# Patient Record
Sex: FEMALE
[De-identification: names, ages, dates, MRNs, and addresses within clinical notes are randomized per-mention and may not be internally consistent; named-entity substitution may affect disease eponyms.]

---

## 2022-01-10 ENCOUNTER — HOSPITAL ENCOUNTER (OUTPATIENT)
Dept: HOSPITAL 5 - TRG | Age: 32
Discharge: HOME | End: 2022-01-10
Attending: OBSTETRICS & GYNECOLOGY
Payer: COMMERCIAL

## 2022-01-10 VITALS — SYSTOLIC BLOOD PRESSURE: 126 MMHG | DIASTOLIC BLOOD PRESSURE: 69 MMHG

## 2022-01-10 DIAGNOSIS — Z3A.31: ICD-10-CM

## 2022-01-10 DIAGNOSIS — O36.8130: Primary | ICD-10-CM

## 2022-01-10 PROCEDURE — 59025 FETAL NON-STRESS TEST: CPT

## 2022-01-10 PROCEDURE — 76819 FETAL BIOPHYS PROFIL W/O NST: CPT

## 2022-01-10 PROCEDURE — 76815 OB US LIMITED FETUS(S): CPT

## 2022-01-10 NOTE — ULTRASOUND REPORT
ULTRASOUND FETAL BIOPHYSICAL PROFILE

ULTRASOUND OB LIMITED 



INDICATION:  fetal well being



TECHNIQUE: Transabdominal ultrasound imaging.



COMPARISON:  None



FINDINGS:



Fetal breathing movement = 2

Gross body movement = 2

Fetal tone = 2

Qualitative amniotic fluid volume = 2



Total biophysical score = 8/8



Amniotic fluid index is 13.5 cm. 

Presentation is cephalic. 

Fetal heart rate is 130 beats per minute.

Placenta: Posterior, grade 1. No evidence for abruption.



IMPRESSION:

Fetal biophysical profile equals 8/8.



Signer Name: Russ Lopez Jr, MD 

Signed: 1/10/2022 12:39 PM

Workstation Name: [id number]

## 2022-01-10 NOTE — ULTRASOUND REPORT
ULTRASOUND FETAL BIOPHYSICAL PROFILE

ULTRASOUND OB LIMITED 



INDICATION:  fetal well being



TECHNIQUE: Transabdominal ultrasound imaging.



COMPARISON:  None



FINDINGS:



Fetal breathing movement = 2

Gross body movement = 2

Fetal tone = 2

Qualitative amniotic fluid volume = 2



Total biophysical score = 8/8



Amniotic fluid index is 13.5 cm. 

Presentation is cephalic. 

Fetal heart rate is 130 beats per minute.

Placenta: Posterior, grade 1. No evidence for abruption.



IMPRESSION:

Fetal biophysical profile equals 8/8.



Signer Name: Russ Lopez Jr, MD 

Signed: 1/10/2022 12:39 PM

Workstation Name: [id number]

## 2022-02-07 ENCOUNTER — HOSPITAL ENCOUNTER (OUTPATIENT)
Dept: HOSPITAL 5 - TRG | Age: 32
Discharge: HOME | End: 2022-02-07
Attending: OBSTETRICS & GYNECOLOGY
Payer: COMMERCIAL

## 2022-02-07 VITALS — SYSTOLIC BLOOD PRESSURE: 132 MMHG | DIASTOLIC BLOOD PRESSURE: 77 MMHG

## 2022-02-07 DIAGNOSIS — O13.3: Primary | ICD-10-CM

## 2022-02-07 DIAGNOSIS — Z3A.36: ICD-10-CM

## 2022-02-07 LAB
ALT SERPL-CCNC: 7 UNITS/L (ref 7–56)
BILIRUB UR QL STRIP: (no result)
BLOOD UR QL VISUAL: (no result)
HCT VFR BLD CALC: 26.9 % (ref 30.3–42.9)
HGB BLD-MCNC: 8 GM/DL (ref 10.1–14.3)
MCHC RBC AUTO-ENTMCNC: 30 % (ref 30–34)
MCV RBC AUTO: 69 FL (ref 79–97)
PH UR STRIP: 7 [PH] (ref 5–7)
PLATELET # BLD: 170 K/MM3 (ref 140–440)
PROT UR STRIP-MCNC: >500 MG/DL
RBC # BLD AUTO: 3.91 M/MM3 (ref 3.65–5.03)
RBC #/AREA URNS HPF: < 1 /HPF (ref 0–6)
URATE SERPL-MCNC: 4.6 MG/DL (ref 3.5–7.6)
UROBILINOGEN UR-MCNC: < 2 MG/DL (ref ?–2)
WBC #/AREA URNS HPF: < 1 /HPF (ref 0–6)

## 2022-02-07 PROCEDURE — 84450 TRANSFERASE (AST) (SGOT): CPT

## 2022-02-07 PROCEDURE — 82565 ASSAY OF CREATININE: CPT

## 2022-02-07 PROCEDURE — 83615 LACTATE (LD) (LDH) ENZYME: CPT

## 2022-02-07 PROCEDURE — 85027 COMPLETE CBC AUTOMATED: CPT

## 2022-02-07 PROCEDURE — 84550 ASSAY OF BLOOD/URIC ACID: CPT

## 2022-02-07 PROCEDURE — 36415 COLL VENOUS BLD VENIPUNCTURE: CPT

## 2022-02-07 PROCEDURE — 81001 URINALYSIS AUTO W/SCOPE: CPT

## 2022-02-07 PROCEDURE — 84460 ALANINE AMINO (ALT) (SGPT): CPT

## 2022-02-16 ENCOUNTER — HOSPITAL ENCOUNTER (INPATIENT)
Dept: HOSPITAL 5 - APU | Age: 32
LOS: 2 days | Discharge: HOME | End: 2022-02-18
Attending: OBSTETRICS & GYNECOLOGY | Admitting: OBSTETRICS & GYNECOLOGY
Payer: COMMERCIAL

## 2022-02-16 DIAGNOSIS — O34.211: ICD-10-CM

## 2022-02-16 DIAGNOSIS — Z3A.37: ICD-10-CM

## 2022-02-16 DIAGNOSIS — Z20.822: ICD-10-CM

## 2022-02-16 LAB
ALT SERPL-CCNC: 9 UNITS/L (ref 7–56)
BACTERIA #/AREA URNS HPF: (no result) /HPF
BASOPHILS # (AUTO): 0 K/MM3 (ref 0–0.1)
BASOPHILS NFR BLD AUTO: 0.5 % (ref 0–1.8)
BILIRUB UR QL STRIP: (no result)
BLOOD UR QL VISUAL: (no result)
EOSINOPHIL # BLD AUTO: 0 K/MM3 (ref 0–0.4)
EOSINOPHIL NFR BLD AUTO: 0.2 % (ref 0–4.3)
HCT VFR BLD CALC: 25.4 % (ref 30.3–42.9)
HGB BLD-MCNC: 7.7 GM/DL (ref 10.1–14.3)
LYMPHOCYTES # BLD AUTO: 2.1 K/MM3 (ref 1.2–5.4)
LYMPHOCYTES NFR BLD AUTO: 20 % (ref 13.4–35)
MCHC RBC AUTO-ENTMCNC: 30 % (ref 30–34)
MCV RBC AUTO: 68 FL (ref 79–97)
MONOCYTES # (AUTO): 0.6 K/MM3 (ref 0–0.8)
MONOCYTES % (AUTO): 5.5 % (ref 0–7.3)
MUCOUS THREADS #/AREA URNS HPF: (no result) /HPF
PH UR STRIP: 5 [PH] (ref 5–7)
PLATELET # BLD: 207 K/MM3 (ref 140–440)
PROT UR STRIP-MCNC: >500 MG/DL
RBC # BLD AUTO: 3.72 M/MM3 (ref 3.65–5.03)
RBC #/AREA URNS HPF: 2 /HPF (ref 0–6)
URATE SERPL-MCNC: 4.8 MG/DL (ref 3.5–7.6)
UROBILINOGEN UR-MCNC: < 2 MG/DL (ref ?–2)
WBC #/AREA URNS HPF: 16 /HPF (ref 0–6)

## 2022-02-16 PROCEDURE — 81001 URINALYSIS AUTO W/SCOPE: CPT

## 2022-02-16 PROCEDURE — 84460 ALANINE AMINO (ALT) (SGPT): CPT

## 2022-02-16 PROCEDURE — 86901 BLOOD TYPING SEROLOGIC RH(D): CPT

## 2022-02-16 PROCEDURE — 30233N1 TRANSFUSION OF NONAUTOLOGOUS RED BLOOD CELLS INTO PERIPHERAL VEIN, PERCUTANEOUS APPROACH: ICD-10-PCS | Performed by: OBSTETRICS & GYNECOLOGY

## 2022-02-16 PROCEDURE — 84450 TRANSFERASE (AST) (SGOT): CPT

## 2022-02-16 PROCEDURE — 86900 BLOOD TYPING SEROLOGIC ABO: CPT

## 2022-02-16 PROCEDURE — 36415 COLL VENOUS BLD VENIPUNCTURE: CPT

## 2022-02-16 PROCEDURE — 84550 ASSAY OF BLOOD/URIC ACID: CPT

## 2022-02-16 PROCEDURE — 87086 URINE CULTURE/COLONY COUNT: CPT

## 2022-02-16 PROCEDURE — 85014 HEMATOCRIT: CPT

## 2022-02-16 PROCEDURE — 85025 COMPLETE CBC W/AUTO DIFF WBC: CPT

## 2022-02-16 PROCEDURE — 86850 RBC ANTIBODY SCREEN: CPT

## 2022-02-16 PROCEDURE — 83615 LACTATE (LD) (LDH) ENZYME: CPT

## 2022-02-16 PROCEDURE — P9016 RBC LEUKOCYTES REDUCED: HCPCS

## 2022-02-16 PROCEDURE — 0UB70ZZ EXCISION OF BILATERAL FALLOPIAN TUBES, OPEN APPROACH: ICD-10-PCS | Performed by: OBSTETRICS & GYNECOLOGY

## 2022-02-16 PROCEDURE — 86920 COMPATIBILITY TEST SPIN: CPT

## 2022-02-16 PROCEDURE — 82565 ASSAY OF CREATININE: CPT

## 2022-02-16 PROCEDURE — 85018 HEMOGLOBIN: CPT

## 2022-02-16 PROCEDURE — 88302 TISSUE EXAM BY PATHOLOGIST: CPT

## 2022-02-16 PROCEDURE — U0003 INFECTIOUS AGENT DETECTION BY NUCLEIC ACID (DNA OR RNA); SEVERE ACUTE RESPIRATORY SYNDROME CORONAVIRUS 2 (SARS-COV-2) (CORONAVIRUS DISEASE [COVID-19]), AMPLIFIED PROBE TECHNIQUE, MAKING USE OF HIGH THROUGHPUT TECHNOLOGIES AS DESCRIBED BY CMS-2020-01-R: HCPCS

## 2022-02-16 RX ADMIN — SODIUM CHLORIDE, SODIUM LACTATE, POTASSIUM CHLORIDE, AND CALCIUM CHLORIDE SCH MLS/HR: .6; .31; .03; .02 INJECTION, SOLUTION INTRAVENOUS at 19:07

## 2022-02-16 RX ADMIN — SODIUM CHLORIDE, SODIUM LACTATE, POTASSIUM CHLORIDE, AND CALCIUM CHLORIDE SCH MLS/HR: .6; .31; .03; .02 INJECTION, SOLUTION INTRAVENOUS at 20:13

## 2022-02-16 NOTE — ANESTHESIA CONSULTATION
Anesthesia Consult and Med Hx


Date of service: 02/16/22





- Airway


Anesthetic Teeth Evaluation: Good


ROM Head & Neck: Adequate


Mental/Hyoid Distance: Adequate


Mallampati Class: Class II


Intubation Access Assessment: Probably Good





- Pulmonary Exam


CTA: Yes





- Cardiac Exam


Cardiac Exam: RRR





- Pre-Operative Health Status


ASA Pre-Surgery Classification: ASA3


Proposed Anesthetic Plan: Spinal





- Pulmonary


Hx Asthma: No


COPD: No


Hx Pneumonia: No





- Cardiovascular System


Hx Hypertension: Yes (CURRENT PREGNANCY)





- Central Nervous System


Hx Seizures: Yes (ECLAMPSIA IN 2011)


Hx Psychiatric Problems: No





- Endocrine


Hx Renal Disease: No


Hx End Stage Renal Disease: No


Hx Hypothyroidism: No


Hx Hyperthyroidism: No





- Hematic


Hx Anemia: Yes


Hx Sickle Cell Disease: No





- Other Systems


Hx Alcohol Use: No

## 2022-02-16 NOTE — OPERATIVE REPORT
Operative Report


Operative Report: 





Preoperative diagnosis: Intrauterine pregnancy at 37 and 1/7 weeks


                               2.  Previous  x3


                  3. Undesired fertility


                 4.  Preeclampsia mild





Postoperative diagnosis: Same





Procedure: Repeat low transverse  section, Bilateral tubal ligation





Surgeon: Dr. Sunita Rosales





EBL: 510 cc


Urine output: 25 mL


IV fluids: 1000     mL


Findings: Viable male  in the vertex position.     Weight 5 lbs. 13 oz. 

2640 g Apgars 8 and 9.  Otherwise normal pelvic anatomy


Specimens: Portion of right and left fallopian tube


Complications: None





Procedure: The patient was admitted to the OR with IV running and in place.  She

was properly identified as herself.  She was given spinal anesthesia in the OR 

without difficulty.   She was placed in the dorsal supine position with a 

leftward tilt.  A Baptiste catheter was inserted.  She was then prepped and draped 

in the normal sterile fashion.  An Allis test was used to confirm adequate 

anesthesia.  Once confirmed, the incision was made with the scalpel and carried 

to the underlying fascia using the scalpel and the Bovie.  The fascia was 

incised in the midline and incision was extended bilaterally using the curved 

Schmitz scissors.  The fascia was then dissected from the underlying rectus muscles

in a series of sharp and blunt dissection using the Schmitz scissors.  Muscles were

 in the in the midline sharply using Metzenbaum scissors and the 

peritoneum was entered into bluntly using the surgeon's fingers.  A bladder 

blade was then placed into the incision to protect the bladder.  Following this 

the bladder flap was created.  Hysterotomy incision was then made in the 

scalpel.  Upon uterine entry, the amniotic sac was ruptured for clear fluid.  

The infant was then delivered without difficulty.  His mouth and nose were 

suctioned on the field.  The cord was clamped and cut and he was handed to the 

waiting NICU personnel.  The placenta was then delivered without difficulty 

using the surgeon's hands.


The uterus  was then exteriorized and cleared of all clots and debris.  The 

hysterotomy incision was then closed in a running locked fashion using 0 Vicryl.

 The abdomen was then copiously irrigated with warm normal saline.  Attention 

was turned the the fallopian tubes. Each tube was identified and followed out 

the the fimbriated end. Each tube was grasped in the midportion and ligated in 

the [place] style Tubal ligation.  Following this the uterus was replaced into 

the abdominal cavity.  At this point the muscles were reapproximated in the 

midline using individual sutures of 0 Vicryl.  Following this the fascia was 

closed in a running fashion using 0 Vicryl.  Tissue was then copiously 

irrigated. Retention sutures were placed in the subcutaneous fat tissue  Skin 

was closed in a running fashion using 3-0 Monocryl.  The sponge lap needle and 

instrument counts were correct 2.  The patient tolerated the procedure well.  

She was taken to recovery in stable condition.

## 2022-02-16 NOTE — HISTORY AND PHYSICAL REPORT
History of Present Illness


Date of examination: 22


Date of admission: 


22 17:51





Chief complaint: 





My blood pressure is high


History of present illness: 





Pt is a 31 year old  who presents at 37.1 weeks with newly elevated blood 

pressure and edema. Pt also continues to proteinuria. Pressures in office were 

160-170/100s.Pt's prenatal course has been otherwise uncomplicated except for 

recent onset hypertension. 





Past History


Past Medical History: hematologic disorders (anemia), other (crohn's disease)


Past Surgical History:  section (x2)


Family/Genetic History: none


Social history: 





- Obstetrical History


Expected Date of Delivery: 22


Actual Gestation: 37 Week(s) 1 Day(s) 


: 5


Para: 3


Number of Living Children: 3





Medications and Allergies


                                    Allergies











Allergy/AdvReac Type Severity Reaction Status Date / Time


 


No Known Allergies Allergy   Verified 22 13:29











                                Home Medications











 Medication  Instructions  Recorded  Confirmed  Last Taken  Type


 


Prenatal Tablet 1 tab PO DAILY 10/18/18 10/18/18 1 Day Ago History





    ~22 


 


Iron 1 tab PO DAILY 22 1 Day Ago History





    ~22 











Active Meds: 


Active Medications





Famotidine (Famotidine 20 Mg/2 Ml Inj)  20 mg IV ONCE [place]


Lactated Ringer's (Lactated Ringers)  1,000 mls @ 2,250 mls/hr IV PREOP [place]


   Stop: 22 20:27


   Last Admin: 22 19:07 Dose:  2,250 mls/hr


   


Oxytocin/Sodium Chloride (Pitocin/Ns 30 Unit/500ml)  30 units in 500 mls @ 0 

mls/hr IV TITR [place]; Protocol











Review of Systems


All systems: negative


Constitutional: fatigue


Eyes: deferred


Ears, nose, mouth and throat: deferred


Gastrointestinal: abdominal pain


Genitourinary: deferred, pelvic pain, contractions


Rectal Exam: deferred





- Vital Signs


Vital signs: 


                                   Vital Signs











Pulse Pulse Ox


 


 70   97 


 


 22 18:14  22 18:14








                                        











Temp Pulse Resp BP Pulse Ox


 


 98.1 F   79   20   135/74   97 


 


 22 18:16  22 19:24  22 18:16  22 19:15  22 19:24














- Physical Exam


Breasts: Positive: deferred


Cardiovascular: Regular rate, Normal S1, Normal S2


Lungs: Positive: Clear to auscultation, Normal air movement


Abdomen: Positive: normal appearance, soft, normal bowel sounds.  Negative: 

distention, tenderness


Genitourinary (Female): Positive: normal external genitalia, normal perenium


Vulva: both: normal


Vagina: Positive: normal moisture.  Negative: discharge


Cervix: Negative: lesion, discharge


Uterus: Positive: normal size, normal contour


Adnexa: both: normal


Anus/Rectum: Positive: normal perianal skin, heme negative.  Negative: rectal 

mass, hemorrhoids


Extremities: 


Deep Tendon Reflex Grade: Normal +2





- Obstetrical


FHR: auscultation normal


Cervical Dilatation: 0


Cervical Effacement Percentage: 50


Fetal station: -2


Uterine Contraction Intensity: Moderate





Results


Result Diagrams: 


                                 22 18:40





                                 22 18:40


                              Abnormal lab results











  22 Range/Units





  18:40 18:50 


 


Hgb  7.7 L   (10.1-14.3)  gm/dl


 


Hct  25.4 L   (30.3-42.9)  %


 


MCV  68 L   (79-97)  fl


 


MCH  21 L   (28-32)  pg


 


RDW  19.1 H   (13.2-15.2)  %


 


Seg Neutrophils %  73.8 H   (40.0-70.0)  %


 


Seg Neutrophils #  7.9 H   (1.8-7.7)  K/mm3


 


Urine WBC (Auto)   16.0 H  (0.0-6.0)  /HPF


 


U Epithel Cells (Auto)   40.0 H  (0-13.0)  /HPF








All other labs normal.








Assessment and Plan





IUP at 37.1 weeks here for repeat  secondary to preeclampsia. Will 

admit for surgery.  Consents signed and placed on chart.

## 2022-02-16 NOTE — PROCEDURE NOTE
OB Delivery Note





- Delivery


Date of Delivery: 22


Surgeon: TA RUIZ


Estimated blood loss: 500cc





-  Section


Preop diagnosis: repeat , other (preeclampsia)


Postop diagnosis: same


 section procedure: repeat low transverse, bilateral tubal ligation


Disposition: PACU


Complications: none


Narrative: 





see op report





- Infant


  ** A


Apgar at 1 minute: 8


Apgar at 5 minutes: 9


Infant Gender: Male (2640g 5 pounds 13 ounces)

## 2022-02-17 LAB
HCT VFR BLD CALC: 24 % (ref 30.3–42.9)
HCT VFR BLD CALC: 25.1 % (ref 30.3–42.9)
HGB BLD-MCNC: 7.3 GM/DL (ref 10.1–14.3)
HGB BLD-MCNC: 7.7 GM/DL (ref 10.1–14.3)

## 2022-02-17 RX ADMIN — FERROUS SULFATE TAB 325 MG (65 MG ELEMENTAL FE) SCH MG: 325 (65 FE) TAB at 09:09

## 2022-02-17 RX ADMIN — IBUPROFEN PRN MG: 800 TABLET, FILM COATED ORAL at 17:10

## 2022-02-17 RX ADMIN — DOCUSATE SODIUM SCH MG: 100 CAPSULE, LIQUID FILLED ORAL at 17:11

## 2022-02-17 RX ADMIN — MORPHINE SULFATE PRN MG: 2 INJECTION, SOLUTION INTRAMUSCULAR; INTRAVENOUS at 00:25

## 2022-02-17 RX ADMIN — MORPHINE SULFATE PRN MG: 2 INJECTION, SOLUTION INTRAMUSCULAR; INTRAVENOUS at 06:30

## 2022-02-17 RX ADMIN — IBUPROFEN PRN MG: 800 TABLET, FILM COATED ORAL at 09:09

## 2022-02-18 VITALS — SYSTOLIC BLOOD PRESSURE: 138 MMHG | DIASTOLIC BLOOD PRESSURE: 88 MMHG

## 2022-02-18 RX ADMIN — DOCUSATE SODIUM SCH MG: 100 CAPSULE, LIQUID FILLED ORAL at 10:12

## 2022-02-18 RX ADMIN — FERROUS SULFATE TAB 325 MG (65 MG ELEMENTAL FE) SCH MG: 325 (65 FE) TAB at 10:12

## 2022-02-18 RX ADMIN — IBUPROFEN PRN MG: 800 TABLET, FILM COATED ORAL at 10:12

## 2022-02-18 RX ADMIN — IBUPROFEN PRN MG: 800 TABLET, FILM COATED ORAL at 19:01

## 2022-02-18 RX ADMIN — IBUPROFEN PRN MG: 800 TABLET, FILM COATED ORAL at 02:34

## 2022-02-18 NOTE — DISCHARGE SUMMARY
Providers





- Providers


Date of Admission: 


22 17:51





Date of discharge: 22


Attending physician: 


TA RUIZ





Primary care physician: 


TA RUIZ








Hospitalization


Reason for admission:  section


Delivery: 


Procedure: repeat low transverse


Episiotomy: none


Incision: normal, dry, intact


Postpartum complications: none


Discharge diagnosis: IUP at term delivered


 baby: male


Hospital course: 





unremarkable


Condition at discharge: Good


Disposition: 01 HOME / SELF CARE / HOMELESS





Plan





- Discharge Medications


Prescriptions: 


Ibuprofen [Motrin 800 MG tab] 800 mg PO Q6H PRN #40 tablet


 PRN Reason: Pain, Moderate (4-6)


oxyCODONE /ACETAMINOPHEN [Percocet 5/325] 2 tab PO Q6HR PRN #40 tablet


 PRN Reason: Pain





- Provider Discharge Summary


Activity: routine, no sex for 6 weeks, no heavy lifting 4 weeks, no strenuous 

exercise


Diet: routine


Instructions: routine


Additional instructions: 


[]  Smoking cessation referral if applicable(refer to patient education folder 

for contact #)


[]  Refer to [facility] Booklet








Call your doctor immediately for:


* Fever > 100.5


* Heavy vaginal bleeding ( >1 pad per hour)


* Severe persistent headache


* Shortness of breath


* Reddened, hot, painful area to leg or breast


* Drainage or odor from incision.





* Keep incision clean and dry at all times and follow doctor's instructions 

regarding bathing/showering











- Follow up plan


Follow up: 


TA RUIZ MD [Primary Care Provider] - 14 Days

## 2022-02-18 NOTE — PROGRESS NOTE
Assessment and Plan





POD 2 s/p rltcs. Doing well.Plan for discharge on today.





Subjective





- Subjective


Date of service: 22


Interval history: 





Pt is a 31 year old  who presents at 37.1 weeks with newly elevated blood 

pressure and edema. Pt also continues to proteinuria. Pressures in office were 

160-170/100s.Pt's prenatal course has been otherwise uncomplicated except for 

recent onset hypertension. 


Patient reports: appetite normal, voiding normally, pain well controlled, 

flatus, ambulating normally


[place]: doing well





Objective





- Vital Signs


Latest vital signs: 


                                   Vital Signs











  Temp Pulse Resp BP BP Pulse Ox Pulse Ox


 


 22 10:12    16    


 


 22 08:44        100


 


 22 07:50  98.1 F  68  18   128/76  100 


 


 22 00:16  98.0 F  65  18  130/69   98 


 


 22 22:30        98


 


 22 15:10  98.2 F  82  17   116/70  99 


 


 22 14:34   67  16  122/66   96 


 


 22 14:00  98.5 F  68  16  140/76   94 


 


 22 13:58   69  18  140/76   94 


 


 22 13:33  98.7 F  61  16  135/73   97 








                                Intake and Output











 22





 22:59 06:59 14:59


 


Intake Total 720 480 560


 


Output Total 1550  


 


Balance -830 480 560


 


Intake:   


 


  Oral 480  560


 


  Intake, Free Water 240 480 


 


Output:   


 


  Urine 1550  


 


    Void 1550  


 


Other:   


 


  Total, Intake Amount 240  240


 


  Total, Output Amount 300  


 


  # Voids   


 


    Void 1 1 1














- Exam


Breasts: Present: deferred


Cardiovascular: Present: Regular rate, Normal S1, Normal S2


Lungs: Present: Clear to auscultation, Normal air movement


Abdomen: Present: normal appearance, soft, normal bowel sounds


Vulva: both: normal


Uterus: Present: normal, firm


Extremities: Present: normal


Incision: Present: normal, dry, intact, dressed





- Labs


Labs: 


                              Abnormal lab results











  22 Range/Units





  18:40 


 


Crossmatch  See Detail